# Patient Record
Sex: FEMALE | Race: OTHER | HISPANIC OR LATINO | URBAN - METROPOLITAN AREA
[De-identification: names, ages, dates, MRNs, and addresses within clinical notes are randomized per-mention and may not be internally consistent; named-entity substitution may affect disease eponyms.]

---

## 2022-07-09 ENCOUNTER — TELEPHONE ENCOUNTER (OUTPATIENT)
Dept: URBAN - METROPOLITAN AREA CLINIC 121 | Facility: CLINIC | Age: 52
End: 2022-07-09

## 2022-07-10 ENCOUNTER — TELEPHONE ENCOUNTER (OUTPATIENT)
Dept: URBAN - METROPOLITAN AREA CLINIC 121 | Facility: CLINIC | Age: 52
End: 2022-07-10

## 2022-07-10 RX ORDER — ESOMEPRAZOLE MAGNESIUM 40 MG
CAPSULE,DELAYED RELEASE (ENTERIC COATED) ORAL TWICE A DAY
Refills: 3 | Status: ACTIVE | COMMUNITY
Start: 2011-02-16

## 2022-07-10 RX ORDER — HYDROXYZINE PAMOATE 50 MG/1
CAPSULE ORAL TAKE AS DIRECTED
Refills: 0 | Status: ACTIVE | COMMUNITY
Start: 2011-01-28

## 2022-07-10 RX ORDER — TRAMADOL HYDROCHLORIDE 50 MG/1
TABLET ORAL TAKE AS DIRECTED
Refills: 0 | Status: ACTIVE | COMMUNITY
Start: 2011-01-28

## 2023-08-08 ENCOUNTER — OFFICE VISIT (OUTPATIENT)
Dept: URBAN - METROPOLITAN AREA CLINIC 63 | Facility: CLINIC | Age: 53
End: 2023-08-08

## 2023-08-15 ENCOUNTER — WEB ENCOUNTER (OUTPATIENT)
Dept: URBAN - METROPOLITAN AREA CLINIC 63 | Facility: CLINIC | Age: 53
End: 2023-08-15

## 2023-08-18 ENCOUNTER — OFFICE VISIT (OUTPATIENT)
Dept: URBAN - METROPOLITAN AREA CLINIC 63 | Facility: CLINIC | Age: 53
End: 2023-08-18
Payer: COMMERCIAL

## 2023-08-18 VITALS
TEMPERATURE: 97.8 F | HEART RATE: 78 BPM | WEIGHT: 145.6 LBS | OXYGEN SATURATION: 98 % | SYSTOLIC BLOOD PRESSURE: 126 MMHG | BODY MASS INDEX: 25.8 KG/M2 | HEIGHT: 63 IN | DIASTOLIC BLOOD PRESSURE: 88 MMHG

## 2023-08-18 DIAGNOSIS — Z12.11 COLON CANCER SCREENING: ICD-10-CM

## 2023-08-18 DIAGNOSIS — K64.2 GRADE III HEMORRHOIDS: ICD-10-CM

## 2023-08-18 DIAGNOSIS — R12 HEARTBURN: ICD-10-CM

## 2023-08-18 PROCEDURE — 99204 OFFICE O/P NEW MOD 45 MIN: CPT | Performed by: NURSE PRACTITIONER

## 2023-08-18 RX ORDER — ONDANSETRON HYDROCHLORIDE 4 MG/1
1 TABLET TABLET, FILM COATED ORAL
Qty: 2 | Refills: 0 | OUTPATIENT
Start: 2023-08-18

## 2023-08-18 RX ORDER — LISINOPRIL 20 MG/1
1 TABLET TABLET ORAL ONCE A DAY
Status: ACTIVE | COMMUNITY

## 2023-08-18 RX ORDER — ESOMEPRAZOLE MAGNESIUM 40 MG/1
1 CAPSULE CAPSULE, DELAYED RELEASE ORAL ONCE A DAY
Qty: 90 CAPSULE | Refills: 3 | OUTPATIENT
Start: 2023-08-18

## 2023-08-18 NOTE — HPI-PREVIOUS LABS
Lab work dated 19 May 2023 demonstrates the following abnormalities: Alkaline phosphatase 146, triglycerides 225, HDL 31, , LDL/HDL ratio 3.3, hemoglobin A1c 6.1%, RPR reactive, 1: 64, Treponema pallidum antibodies reactive.  All remaining lab values of CBC, CMP, chlamydia panel, Neisseria panel, TSH, HBV antigen and lipid panel are negative or within normal limits.

## 2023-08-18 NOTE — HPI-TODAY'S VISIT:
Thank you very much for kindly referring Jerrica Bruce, a very pleasant 53-year-old female, back to our service for screening colonoscopy and acid reflux.  Past medical history significant for hypertension, hyperlipidemia, anxiety, depression, allergic rhinitis, syphilis, GERD and PUD.  Past surgical history significant for cholecystectomy.  Her last EGD and complete colonoscopy was 16 February 2011 was significant for a 6 mm hiatal hernia in the esophagus, a 10 mm polypoid lesion removed from the transverse colon and internal hemorrhoids.  She denies a family history of colon polyps, colon cancer or other GI malignancy.  Jerrica presents today complaining of acid reflux, heartburn and rare difficulty swallowing that has worsened over the past 6 months.  She currently uses nothing for her upper GI symptoms but relates good efficacy with previous use of Nexium.  She also complains of hemorrhoidal discomfort and occasional slight bright red blood on the paper when cleaning herself.  She is otherwise asymptomatic from a GI perspective and relates 1-2 unremarkable bowel movements per day of soft brown stool.  She denies dysphagia, dyspepsia, abdominal pain, change in bowel habits, melena or unintentional weight loss..

## 2023-08-18 NOTE — HPI-PREVIOUS PROCEDURES
EGD-colonoscopy/16 February 2011.  6 cm hiatal hernia visible in the esophagus.  The stomach appeared to be normal.  The duodenum appeared to be normal.  10 mm polypoid lesion removed from the transverse colon.  Internal hemorrhoids present.  Pathology demonstrates mild focal nonspecific chronic inflammation in the antral and esophageal biopsies with all remaining findings negative or benign.  Recommend repeat colonoscopy in 5-7 years.

## 2023-08-23 ENCOUNTER — TELEPHONE ENCOUNTER (OUTPATIENT)
Dept: URBAN - METROPOLITAN AREA SURGERY CENTER 4 | Facility: SURGERY CENTER | Age: 53
End: 2023-08-23

## 2023-08-24 ENCOUNTER — CLAIMS CREATED FROM THE CLAIM WINDOW (OUTPATIENT)
Dept: URBAN - METROPOLITAN AREA SURGERY CENTER 4 | Facility: SURGERY CENTER | Age: 53
End: 2023-08-24
Payer: COMMERCIAL

## 2023-08-24 ENCOUNTER — CLAIMS CREATED FROM THE CLAIM WINDOW (OUTPATIENT)
Dept: URBAN - METROPOLITAN AREA CLINIC 4 | Facility: CLINIC | Age: 53
End: 2023-08-24
Payer: COMMERCIAL

## 2023-08-24 DIAGNOSIS — Z12.11 COLON CANCER SCREENING (HIGH RISK): ICD-10-CM

## 2023-08-24 DIAGNOSIS — D12.3 BENIGN NEOPLASM OF TRANSVERSE COLON: ICD-10-CM

## 2023-08-24 DIAGNOSIS — Z12.11 COLON CANCER SCREENING: ICD-10-CM

## 2023-08-24 DIAGNOSIS — K64.1 GRADE II HEMORRHOIDS: ICD-10-CM

## 2023-08-24 DIAGNOSIS — K64.1 INTERNAL HEMORRHOIDS, GRADE II: ICD-10-CM

## 2023-08-24 DIAGNOSIS — K63.5 BENIGN COLON POLYPS: ICD-10-CM

## 2023-08-24 PROCEDURE — 88305 TISSUE EXAM BY PATHOLOGIST: CPT | Performed by: PATHOLOGY

## 2023-08-24 PROCEDURE — 00811 ANES LWR INTST NDSC NOS: CPT | Performed by: NURSE ANESTHETIST, CERTIFIED REGISTERED

## 2023-08-24 PROCEDURE — 45380 COLONOSCOPY AND BIOPSY: CPT | Performed by: INTERNAL MEDICINE

## 2023-08-24 RX ORDER — ONDANSETRON HYDROCHLORIDE 4 MG/1
1 TABLET TABLET, FILM COATED ORAL
Qty: 2 | Refills: 0 | Status: ACTIVE | COMMUNITY
Start: 2023-08-18

## 2023-08-24 RX ORDER — LISINOPRIL 20 MG/1
1 TABLET TABLET ORAL ONCE A DAY
Status: ACTIVE | COMMUNITY

## 2023-08-24 RX ORDER — ESOMEPRAZOLE MAGNESIUM 40 MG/1
1 CAPSULE CAPSULE, DELAYED RELEASE ORAL ONCE A DAY
Qty: 90 CAPSULE | Refills: 3 | Status: ACTIVE | COMMUNITY
Start: 2023-08-18

## 2023-08-25 ENCOUNTER — LAB OUTSIDE AN ENCOUNTER (OUTPATIENT)
Dept: URBAN - METROPOLITAN AREA CLINIC 63 | Facility: CLINIC | Age: 53
End: 2023-08-25

## 2023-09-05 ENCOUNTER — TELEPHONE ENCOUNTER (OUTPATIENT)
Dept: URBAN - METROPOLITAN AREA CLINIC 63 | Facility: CLINIC | Age: 53
End: 2023-09-05

## 2023-09-08 ENCOUNTER — OFFICE VISIT (OUTPATIENT)
Dept: URBAN - METROPOLITAN AREA CLINIC 63 | Facility: CLINIC | Age: 53
End: 2023-09-08
Payer: COMMERCIAL

## 2023-09-08 ENCOUNTER — DASHBOARD ENCOUNTERS (OUTPATIENT)
Age: 53
End: 2023-09-08

## 2023-09-08 VITALS — BODY MASS INDEX: 25.69 KG/M2 | WEIGHT: 145 LBS | HEIGHT: 63 IN

## 2023-09-08 DIAGNOSIS — D12.3 ADENOMATOUS POLYP OF TRANSVERSE COLON: ICD-10-CM

## 2023-09-08 DIAGNOSIS — R14.0 POSTPRANDIAL ABDOMINAL BLOATING: ICD-10-CM

## 2023-09-08 DIAGNOSIS — K64.1 GRADE II HEMORRHOIDS: ICD-10-CM

## 2023-09-08 PROCEDURE — 99213 OFFICE O/P EST LOW 20 MIN: CPT | Performed by: NURSE PRACTITIONER

## 2023-09-08 RX ORDER — LISINOPRIL 20 MG/1
1 TABLET TABLET ORAL ONCE A DAY
Status: ACTIVE | COMMUNITY

## 2023-09-08 RX ORDER — ONDANSETRON HYDROCHLORIDE 4 MG/1
1 TABLET TABLET, FILM COATED ORAL
Qty: 2 | Refills: 0 | Status: ACTIVE | COMMUNITY
Start: 2023-08-18

## 2023-09-08 RX ORDER — ESOMEPRAZOLE MAGNESIUM 40 MG/1
1 CAPSULE CAPSULE, DELAYED RELEASE ORAL ONCE A DAY
Qty: 90 CAPSULE | Refills: 3 | Status: ACTIVE | COMMUNITY
Start: 2023-08-18

## 2023-09-08 NOTE — HPI-PREVIOUS PROCEDURES
Colonoscopy/24 August 2023.  6 mm tubular adenomatous polyp removed from the hepatic flexure.  Internal hemorrhoids present.  All remaining findings are negative or benign.  Recommend repeat colonoscopy in 5 years due to small adenomatous polyp removed from this procedure. ********** EGD-colonoscopy/16 February 2011.  6 cm hiatal hernia visible in the esophagus.  The stomach appeared to be normal.  The duodenum appeared to be normal.  10 mm polypoid lesion removed from the transverse colon.  Internal hemorrhoids present.  Pathology demonstrates mild focal nonspecific chronic inflammation in the antral and esophageal biopsies with all remaining findings negative or benign.  Recommend repeat colonoscopy in 5-7 years.

## 2023-09-08 NOTE — HPI-TODAY'S VISIT:
Jerrica Bruce is a very pleasant 53-year-old female seen in follow-up of colonoscopy (see results below).  She admits to tolerating the procedure very easily without any postprocedure complications.  Her bowel habits have returned to normal, but she still complains of gas and bloating.  Of note, she does consume dairy on a regular basis and has not tried dairy elimination.  She is using use omeprazole, 40 mg, once daily with otherwise good efficacy.

## 2023-09-27 ENCOUNTER — P2P PATIENT RECORD (OUTPATIENT)
Age: 53
End: 2023-09-27

## 2025-01-28 ENCOUNTER — TELEPHONE ENCOUNTER (OUTPATIENT)
Dept: URBAN - METROPOLITAN AREA CLINIC 63 | Facility: CLINIC | Age: 55
End: 2025-01-28

## 2025-01-29 ENCOUNTER — OFFICE VISIT (OUTPATIENT)
Dept: URBAN - METROPOLITAN AREA CLINIC 63 | Facility: CLINIC | Age: 55
End: 2025-01-29

## 2025-02-06 ENCOUNTER — OFFICE VISIT (OUTPATIENT)
Dept: URBAN - METROPOLITAN AREA CLINIC 60 | Facility: CLINIC | Age: 55
End: 2025-02-06

## 2025-02-06 RX ORDER — ESCITALOPRAM 10 MG/1
TAKE 1 TABLET BY MOUTH DAILY TABLET, FILM COATED ORAL
Qty: 30 EACH | Refills: 0 | Status: ACTIVE | COMMUNITY

## 2025-02-06 RX ORDER — TERBINAFINE HYDROCHLORIDE 250 MG/1
TAKE 1 TABLET BY MOUTH DAILY TABLET ORAL
Qty: 30 EACH | Refills: 0 | Status: ACTIVE | COMMUNITY

## 2025-02-06 RX ORDER — LISINOPRIL 20 MG/1
1 TABLET TABLET ORAL ONCE A DAY
Status: ACTIVE | COMMUNITY

## 2025-02-06 RX ORDER — KETOCONAZOLE 20 MG/G
CREAM TOPICAL
Qty: 60 PACK | Refills: 0 | Status: ACTIVE | COMMUNITY

## 2025-02-06 NOTE — HPI-TODAY'S VISIT:
This is a very pleasant 54-year-old female who presents to the office with a chief complaint of "colon/EGD consult".  Past medical history is significant for hypertension, hyperlipidemia, anxiety disorder, depression, allergic rhinitis, syphilis, GERD, PUD, hiatal hernia.  Past surgical history is significant for cholecystectomy, EGD, colonoscopy.  Family history is significant for paternal ulcerative colitis, and paternal unspecified cancer. Last colonoscopy 8/24/2023, Dr. Atkinson, 5-year recall Last endoscopy 2/16/2011, Dr. Atkinson Cardiologist:? . Patient was last seen in the office on 9/8/2023 for a colonoscopy follow-up with JR Myers.  At that visit, patient had tolerated the procedure well without complications.  She still had complaints of black gas and bloating.  She was consuming dairy on a regular basis and had not tried dairy elimination.  She was using omeprazole 40 mg with good efficacy.

## 2025-02-13 ENCOUNTER — TELEPHONE ENCOUNTER (OUTPATIENT)
Dept: URBAN - METROPOLITAN AREA CLINIC 63 | Facility: CLINIC | Age: 55
End: 2025-02-13

## 2025-02-20 ENCOUNTER — LAB OUTSIDE AN ENCOUNTER (OUTPATIENT)
Dept: URBAN - METROPOLITAN AREA CLINIC 60 | Facility: CLINIC | Age: 55
End: 2025-02-20

## 2025-02-20 ENCOUNTER — OFFICE VISIT (OUTPATIENT)
Dept: URBAN - METROPOLITAN AREA CLINIC 60 | Facility: CLINIC | Age: 55
End: 2025-02-20
Payer: COMMERCIAL

## 2025-02-20 ENCOUNTER — OFFICE VISIT (OUTPATIENT)
Dept: URBAN - METROPOLITAN AREA CLINIC 60 | Facility: CLINIC | Age: 55
End: 2025-02-20

## 2025-02-20 VITALS
DIASTOLIC BLOOD PRESSURE: 80 MMHG | OXYGEN SATURATION: 99 % | HEART RATE: 72 BPM | SYSTOLIC BLOOD PRESSURE: 120 MMHG | BODY MASS INDEX: 25.69 KG/M2 | HEIGHT: 63 IN | WEIGHT: 145 LBS

## 2025-02-20 DIAGNOSIS — R13.19 ESOPHAGEAL DYSPHAGIA: ICD-10-CM

## 2025-02-20 DIAGNOSIS — K21.9 ACID REFLUX: ICD-10-CM

## 2025-02-20 DIAGNOSIS — Z86.19 HISTORY OF HELICOBACTER PYLORI INFECTION: ICD-10-CM

## 2025-02-20 DIAGNOSIS — A04.8 H. PYLORI INFECTION: ICD-10-CM

## 2025-02-20 PROBLEM — 235595009: Status: ACTIVE | Noted: 2025-02-20

## 2025-02-20 PROCEDURE — 99214 OFFICE O/P EST MOD 30 MIN: CPT

## 2025-02-20 RX ORDER — TERBINAFINE HYDROCHLORIDE 250 MG/1
TAKE 1 TABLET BY MOUTH DAILY TABLET ORAL
Qty: 30 EACH | Refills: 0 | Status: ON HOLD | COMMUNITY

## 2025-02-20 RX ORDER — LISINOPRIL 20 MG/1
1 TABLET TABLET ORAL ONCE A DAY
Status: ON HOLD | COMMUNITY

## 2025-02-20 RX ORDER — ESCITALOPRAM 10 MG/1
TAKE 1 TABLET BY MOUTH DAILY TABLET, FILM COATED ORAL
Qty: 30 EACH | Refills: 0 | Status: ACTIVE | COMMUNITY

## 2025-02-20 NOTE — HPI-TODAY'S VISIT:
This is a very pleasant 54-year-old female who presents to the office with a chief complaint of "colon/EGD consult".  Past medical history is significant for hypertension, hyperlipidemia, anxiety disorder, depression, allergic rhinitis, syphilis, GERD, PUD, hiatal hernia.  Past surgical history is significant for cholecystectomy, EGD, colonoscopy.  Family history is significant for paternal ulcerative colitis, and paternal unspecified cancer. Last colonoscopy 8/24/2023, Dr. Atkinson, 5-year recall Last endoscopy 2/16/2011, Dr. Atkinson Cardiologist: none. . Patient was last seen in the office on 9/8/2023 for a colonoscopy follow-up with JR Myers.  At that visit, patient had tolerated the procedure well without complications.  She still had complaints of gas and bloating.  She was consuming dairy on a regular basis and had not tried dairy elimination.  She was using omeprazole 40 mg with good efficacy. . Patient presents today with the tablet for translation explaining that she is having significant gas and bloating, as well as reflux as of the last few months.  She explains she was on omeprazole 40mg in the past, but has more recently discontinued. She explained that she still had these symtpoms while on the Omeprazole. She explains she has a history of H. pylori for infection, once from 2004 and another time from 2009.  She is concerned she is having a repeat of the H. pylori. I explained to the patient that since it has been many years since her last endoscopy, it would be reasonable to evaluate with EGD to rule out PUD.   . Additionally, patient explains that she has been issues with swallowing, although due to translation, it was difficult to understand exactly what patient was explaining.  She explains that she not only has coughing episodes, but also things seem to have a slow transit down her esophagus if she "gets distracted".  I explained we will order barium swallow, as well as modified barium swallow for assessment, and EGD with dilation for evaluation.  Otherwise patient is doing well and had no other GI complaints today. . Patient denies abdominal pain, belching, constipation, diarrhea, melena, hematochezia, hematemesis, nausea, vomiting, BRBPR, and unintentional weight loss.

## 2025-02-28 LAB — RESULT:: (no result)

## 2025-06-02 ENCOUNTER — TELEPHONE ENCOUNTER (OUTPATIENT)
Dept: URBAN - METROPOLITAN AREA CLINIC 64 | Facility: CLINIC | Age: 55
End: 2025-06-02

## 2025-06-02 RX ORDER — SODIUM, POTASSIUM,MAG SULFATES 17.5-3.13G
AS DIRECTED SOLUTION, RECONSTITUTED, ORAL ORAL
Qty: 1 | Refills: 0 | OUTPATIENT
Start: 2025-06-04 | End: 2025-06-06

## 2025-06-02 RX ORDER — ONDANSETRON HYDROCHLORIDE 4 MG/1
1 TABLET TABLET, FILM COATED ORAL
Qty: 2 | Refills: 0 | OUTPATIENT
Start: 2025-06-04

## 2025-06-13 ENCOUNTER — CLAIMS CREATED FROM THE CLAIM WINDOW (OUTPATIENT)
Dept: URBAN - METROPOLITAN AREA SURGERY CENTER 4 | Facility: SURGERY CENTER | Age: 55
End: 2025-06-13
Payer: COMMERCIAL

## 2025-06-13 ENCOUNTER — CLAIMS CREATED FROM THE CLAIM WINDOW (OUTPATIENT)
Dept: URBAN - METROPOLITAN AREA CLINIC 4 | Facility: CLINIC | Age: 55
End: 2025-06-13
Payer: COMMERCIAL

## 2025-06-13 DIAGNOSIS — K31.89 OTHER DISEASES OF STOMACH AND DUODENUM: ICD-10-CM

## 2025-06-13 DIAGNOSIS — K29.70 GASTRITIS WITHOUT BLEEDING, UNSPECIFIED CHRONICITY, UNSPECIFIED GASTRITIS TYPE: ICD-10-CM

## 2025-06-13 DIAGNOSIS — K25.7 CHRONIC GASTRIC ULCER: ICD-10-CM

## 2025-06-13 DIAGNOSIS — K22.2 ESOPHAGEAL STENOSIS: ICD-10-CM

## 2025-06-13 DIAGNOSIS — K44.9 DIAPHRAGMATIC HERNIA WITHOUT OBSTRUCTION OR GANGRENE: ICD-10-CM

## 2025-06-13 DIAGNOSIS — K44.9 HIATAL HERNIA: ICD-10-CM

## 2025-06-13 DIAGNOSIS — K31.89 GASTRIC FOVEOLAR HYPERPLASIA: ICD-10-CM

## 2025-06-13 DIAGNOSIS — K21.9 GASTRO-ESOPHAGEAL REFLUX DISEASE WITHOUT ESOPHAGITIS: ICD-10-CM

## 2025-06-13 DIAGNOSIS — K25.7 CHRONIC GASTRIC ULCER WITHOUT HEMORRHAGE OR PERFORATION: ICD-10-CM

## 2025-06-13 DIAGNOSIS — K22.2 ESOPHAGEAL OBSTRUCTION: ICD-10-CM

## 2025-06-13 DIAGNOSIS — I10 HYPERTENSION, UNSPECIFIED TYPE: ICD-10-CM

## 2025-06-13 PROCEDURE — 88305 TISSUE EXAM BY PATHOLOGIST: CPT | Performed by: PATHOLOGY

## 2025-06-13 PROCEDURE — 43450 DILATE ESOPHAGUS 1/MULT PASS: CPT | Performed by: INTERNAL MEDICINE

## 2025-06-13 PROCEDURE — 43239 EGD BIOPSY SINGLE/MULTIPLE: CPT | Performed by: INTERNAL MEDICINE

## 2025-06-13 PROCEDURE — 88312 SPECIAL STAINS GROUP 1: CPT | Performed by: PATHOLOGY

## 2025-06-13 PROCEDURE — 00731 ANES UPR GI NDSC PX NOS: CPT | Performed by: NURSE ANESTHETIST, CERTIFIED REGISTERED

## 2025-06-13 PROCEDURE — 88342 IMHCHEM/IMCYTCHM 1ST ANTB: CPT | Performed by: PATHOLOGY

## 2025-06-13 PROCEDURE — 88341 IMHCHEM/IMCYTCHM EA ADD ANTB: CPT | Performed by: PATHOLOGY

## 2025-06-13 RX ORDER — ESCITALOPRAM 10 MG/1
TAKE 1 TABLET BY MOUTH DAILY TABLET, FILM COATED ORAL
Qty: 30 EACH | Refills: 0 | Status: ACTIVE | COMMUNITY

## 2025-06-13 RX ORDER — ONDANSETRON HYDROCHLORIDE 4 MG/1
1 TABLET TABLET, FILM COATED ORAL
Qty: 2 | Refills: 0 | Status: ACTIVE | COMMUNITY
Start: 2025-06-04

## 2025-06-13 RX ORDER — TERBINAFINE HYDROCHLORIDE 250 MG/1
TAKE 1 TABLET BY MOUTH DAILY TABLET ORAL
Qty: 30 EACH | Refills: 0 | Status: ON HOLD | COMMUNITY

## 2025-06-13 RX ORDER — LISINOPRIL 20 MG/1
1 TABLET TABLET ORAL ONCE A DAY
Status: ON HOLD | COMMUNITY

## 2025-06-13 RX ORDER — KETOCONAZOLE 20 MG/G
CREAM TOPICAL
Qty: 60 PACK | Refills: 0 | Status: ACTIVE | COMMUNITY

## 2025-06-13 RX ORDER — ESOMEPRAZOLE MAGNESIUM 40 MG/1
1 CAPSULE CAPSULE, DELAYED RELEASE ORAL ONCE A DAY
Qty: 90 CAPSULE | Refills: 3 | Status: ACTIVE | COMMUNITY
Start: 2023-08-18

## 2025-06-13 RX ORDER — ONDANSETRON HYDROCHLORIDE 4 MG/1
1 TABLET TABLET, FILM COATED ORAL
Qty: 2 | Refills: 0 | Status: ACTIVE | COMMUNITY
Start: 2023-08-18

## 2025-06-25 ENCOUNTER — OFFICE VISIT (OUTPATIENT)
Dept: URBAN - METROPOLITAN AREA CLINIC 63 | Facility: CLINIC | Age: 55
End: 2025-06-25

## 2025-06-25 PROBLEM — 266435005: Status: ACTIVE | Noted: 2025-06-25

## 2025-06-25 RX ORDER — PREDNISONE 20 MG/1
TABLET ORAL
Qty: 10 TABLET | Refills: 0 | Status: ACTIVE | COMMUNITY

## 2025-06-25 RX ORDER — METHOCARBAMOL 750 MG/1
TABLET ORAL
Qty: 20 TABLET | Refills: 0 | Status: ACTIVE | COMMUNITY

## 2025-06-25 RX ORDER — LISINOPRIL 20 MG/1
1 TABLET TABLET ORAL ONCE A DAY
Status: ACTIVE | COMMUNITY

## 2025-06-25 RX ORDER — ESOMEPRAZOLE MAGNESIUM 40 MG/1
1 CAPSULE CAPSULE, DELAYED RELEASE ORAL ONCE A DAY
Qty: 90 CAPSULE | Refills: 1
Start: 2023-08-18

## 2025-06-25 RX ORDER — KETOCONAZOLE 20 MG/G
CREAM TOPICAL
Qty: 60 PACK | Refills: 0 | Status: ACTIVE | COMMUNITY

## 2025-06-25 RX ORDER — ESCITALOPRAM 10 MG/1
TAKE 1 TABLET BY MOUTH DAILY TABLET, FILM COATED ORAL
Qty: 30 EACH | Refills: 0 | Status: ACTIVE | COMMUNITY

## 2025-06-25 RX ORDER — ATORVASTATIN CALCIUM 20 MG/1
1 TABLET TABLET, FILM COATED ORAL ONCE A DAY
Status: ACTIVE | COMMUNITY

## 2025-06-25 RX ORDER — ESOMEPRAZOLE MAGNESIUM 40 MG/1
1 CAPSULE CAPSULE, DELAYED RELEASE ORAL ONCE A DAY
Qty: 90 CAPSULE | Refills: 3 | Status: ACTIVE | COMMUNITY
Start: 2023-08-18

## 2025-06-25 RX ORDER — SUCRALFATE 1 G/10ML
10 ML SUSPENSION ORAL TWICE A DAY
Qty: 1200 ML | Refills: 0 | OUTPATIENT
Start: 2025-06-25

## 2025-06-25 RX ORDER — HYDROCORTISONE ACETATE PRAMOXINE HCL 2.5; 1 G/100G; G/100G
1 APPLICATION CREAM TOPICAL THREE TIMES A DAY
Qty: 1 | Refills: 0 | OUTPATIENT
Start: 2025-06-25 | End: 2025-07-09

## 2025-06-25 NOTE — HPI-TODAY'S VISIT:
This is a very pleasant 54-year-old female who presents to the office with a chief complaint of "GERD, dysphagia F/U".  Past medical history is significant for hypertension, hyperlipidemia, anxiety disorder, depression, allergic rhinitis, syphilis, GERD, PUD, hiatal hernia.  Past surgical history is significant for cholecystectomy, EGD, colonoscopy.  Family history is significant for paternal ulcerative colitis, and paternal unspecified cancer. Last colonoscopy 8/24/2023, Dr. Atkinson, 5-year recall Last endoscopy 2/16/2011, Dr. Atkinson Cardiologist: none. . Patient was last seen in the office on 2/20/2025 for GERD, dysphagia.  At last visit, patient was having significant gas bloating and reflux.  She was previously on omeprazole 40 mg in the past but recently discontinued.  She explains that she had the symptoms while on omeprazole.  Additionally she explains she had history of H. pylori infection from 2004 and again from 2009.  I explained that we will evaluate with EGD to rule out PUD.  Additionally patient explained that she was having some difficulty swallowing and food related transit slowly down her esophagus if she "got distracted".  I explained that we will also order a barium swallow and modified barium swallow.

## 2025-06-25 NOTE — HPI-PREVIOUS LABS
Labs dated 4/3/2025 Lipid panel - Total cholesterol 203 - HDL cholesterol 43 - Triglycerides 199 - LDL cholesterol 162 - Non-HDL cholesterol 160 - Rest within normal limits . Labs dated 2/27/2025 - H. pylori; not detected . Lab work dated 19 May 2023 demonstrates the following abnormalities: Alkaline phosphatase 146, triglycerides 225, HDL 31, , LDL/HDL ratio 3.3, hemoglobin A1c 6.1%, RPR reactive, 1: 64, Treponema pallidum antibodies reactive.  All remaining lab values of CBC, CMP, chlamydia panel, Neisseria panel, TSH, HBV antigen and lipid panel are negative or within normal limits.

## 2025-06-25 NOTE — HPI-PREVIOUS PROCEDURES
EGD, 6/13/2025, Dr. Atkinson - Normal duodenal bulb and second portion of the duodenum.  Biopsy. - Gastritis, characterized by erosions.  Biopsied. - 4 cm hiatal hernia. - Benign-appearing esophageal stenosis.  Biopsied.  Dilated. . EGD pathology report, 6/13/2025 - Duodenum, second part biopsy; no significant abnormality. - Stomach, antrum biopsy; chronic ulcer with associated foveolar hyperplasia and granulation tissue.  No evidence of H. pylori organisms or intestinal metaplasia.  Negative for dysplasia or malignancy. - GE junction biopsy; squamous mucosa with reflux-type changes, no columnar mucosa identified.  No evidence of Palencia's esophagus or eosinophilic esophagitis.  Negative for infectious organisms, dysplasia or malignancy. . Colonoscopy/24 August 2023.  6 mm tubular adenomatous polyp removed from the hepatic flexure.  Internal hemorrhoids present.  All remaining findings are negative or benign.  Recommend repeat colonoscopy in 5 years due to small adenomatous polyp removed from this procedure. ********** EGD-colonoscopy/16 February 2011.  6 cm hiatal hernia visible in the esophagus.  The stomach appeared to be normal.  The duodenum appeared to be normal.  10 mm polypoid lesion removed from the transverse colon.  Internal hemorrhoids present.  Pathology demonstrates mild focal nonspecific chronic inflammation in the antral and esophageal biopsies with all remaining findings negative or benign.  Recommend repeat colonoscopy in 5-7 years.

## 2025-07-09 ENCOUNTER — TELEPHONE ENCOUNTER (OUTPATIENT)
Dept: URBAN - METROPOLITAN AREA CLINIC 63 | Facility: CLINIC | Age: 55
End: 2025-07-09

## 2025-07-09 RX ORDER — SUCRALFATE 1 G/1
1 TABLET ON AN EMPTY STOMACH TABLET ORAL TWICE A DAY
Qty: 60 | Refills: 0 | OUTPATIENT
Start: 2025-07-13